# Patient Record
Sex: FEMALE | Race: WHITE | Employment: PART TIME | URBAN - METROPOLITAN AREA
[De-identification: names, ages, dates, MRNs, and addresses within clinical notes are randomized per-mention and may not be internally consistent; named-entity substitution may affect disease eponyms.]

---

## 2017-03-24 ENCOUNTER — HOSPITAL ENCOUNTER (OUTPATIENT)
Dept: MAMMOGRAPHY | Age: 77
Discharge: HOME | End: 2017-03-24
Attending: INTERNAL MEDICINE | Admitting: INTERNAL MEDICINE

## 2017-03-24 DIAGNOSIS — Z12.31 VISIT FOR SCREENING MAMMOGRAM: ICD-10-CM

## 2018-02-19 ENCOUNTER — OFFICE VISIT (OUTPATIENT)
Dept: ORTHOPEDIC SURGERY | Age: 78
End: 2018-02-19

## 2018-02-19 DIAGNOSIS — M25.552 LEFT HIP PAIN: Primary | ICD-10-CM

## 2018-02-19 PROCEDURE — G8420 CALC BMI NORM PARAMETERS: HCPCS | Performed by: ORTHOPAEDIC SURGERY

## 2018-02-19 PROCEDURE — 1036F TOBACCO NON-USER: CPT | Performed by: ORTHOPAEDIC SURGERY

## 2018-02-19 PROCEDURE — 99213 OFFICE O/P EST LOW 20 MIN: CPT | Performed by: ORTHOPAEDIC SURGERY

## 2018-02-19 PROCEDURE — 1123F ACP DISCUSS/DSCN MKR DOCD: CPT | Performed by: ORTHOPAEDIC SURGERY

## 2018-02-19 PROCEDURE — 1090F PRES/ABSN URINE INCON ASSESS: CPT | Performed by: ORTHOPAEDIC SURGERY

## 2018-02-19 PROCEDURE — 4040F PNEUMOC VAC/ADMIN/RCVD: CPT | Performed by: ORTHOPAEDIC SURGERY

## 2018-02-19 PROCEDURE — G8399 PT W/DXA RESULTS DOCUMENT: HCPCS | Performed by: ORTHOPAEDIC SURGERY

## 2018-02-19 PROCEDURE — G8484 FLU IMMUNIZE NO ADMIN: HCPCS | Performed by: ORTHOPAEDIC SURGERY

## 2018-02-19 PROCEDURE — G8427 DOCREV CUR MEDS BY ELIG CLIN: HCPCS | Performed by: ORTHOPAEDIC SURGERY

## 2018-02-26 VITALS
BODY MASS INDEX: 22.81 KG/M2 | RESPIRATION RATE: 18 BRPM | WEIGHT: 116.2 LBS | HEIGHT: 60 IN | SYSTOLIC BLOOD PRESSURE: 165 MMHG | TEMPERATURE: 98.5 F | HEART RATE: 76 BPM | DIASTOLIC BLOOD PRESSURE: 88 MMHG

## 2018-03-03 NOTE — PROGRESS NOTES
Patient is a 43-year-old female status post bilateral total hip arthroplasties. She is status post left hip replacement done in 2014 and right hip replacement done 11 years ago in 2007. Patient states she has occasional pain in both hips and describes the pain as sharp occasionally. She's not had any other significant history of injury or surgery on either right or left hip. Patient does have back problems and has had epidural steroid injections in the past.  She had an MRI obtained of her lumbar spine which shows severe degenerative osteoarthritis. Patient's here for further evaluation and follow-up of her back pain and hip pain. Patient Active Problem List   Diagnosis    Primary localized osteoarthrosis, pelvic region and thigh    H/O total hip arthroplasty    History of total hip replacement    Pain of lumbar spine     Prior to Visit Medications    Medication Sig Taking? Authorizing Provider   ibuprofen (ADVIL;MOTRIN) 200 MG tablet Take 400 mg by mouth every 6 hours as needed for Pain  Historical Provider, MD   aspirin 81 MG tablet Take 81 mg by mouth daily   Historical Provider, MD   Multiple Minerals-Vitamins (CITRACAL PLUS) TABS Take 2 tablets by mouth daily. Historical Provider, MD   Multiple Vitamins-Minerals (THERAPEUTIC MULTIVITAMIN-MINERALS) tablet Take 1 tablet by mouth daily. Historical Provider, MD   calcium carbonate (OSCAL) 500 MG TABS tablet Take 500 mg by mouth daily. Historical Provider, MD   losartan (COZAAR) 100 MG tablet Take 100 mg by mouth nightly. Historical Provider, MD   atenolol (TENORMIN) 50 MG tablet Take 50 mg by mouth nightly. Historical Provider, MD   hydrochlorothiazide (HYDRODIURIL) 25 MG tablet Take 25 mg by mouth daily. Historical Provider, MD     The above medication and passed medical problems were reviewed with the patient. Physical examination 72-year-old female oriented ×3 temperature 98.5.   Examination of her back shows significant loss of range of might eventually require evaluation and/or conservative versus surgical management of her lumbar spine. Plan we had a 15 minute face-to-face discussion with this patient of which greater than 50% of the time was spent in counseling her about further evaluation of her bilateral total hips. We recommend yearly evaluation both physical and radiographic in nature. We also discussed with her her lumbosacral condition and suggest that she be evaluated and follow-up with a spine clinic or spine surgeon. We will follow her up in a year with new x-rays of her hips at that time.

## 2018-03-28 ENCOUNTER — HOSPITAL ENCOUNTER (OUTPATIENT)
Dept: MAMMOGRAPHY | Age: 78
Discharge: HOME | End: 2018-03-28
Attending: INTERNAL MEDICINE | Admitting: INTERNAL MEDICINE

## 2018-03-28 DIAGNOSIS — Z12.31 VISIT FOR SCREENING MAMMOGRAM: ICD-10-CM

## 2019-04-01 ENCOUNTER — HOSPITAL ENCOUNTER (OUTPATIENT)
Dept: MAMMOGRAPHY | Age: 79
Discharge: HOME | End: 2019-04-01
Payer: MEDICARE

## 2019-04-01 DIAGNOSIS — Z12.31 VISIT FOR SCREENING MAMMOGRAM: ICD-10-CM

## 2019-04-01 PROCEDURE — 77063 BREAST TOMOSYNTHESIS BI: CPT

## 2019-05-13 ENCOUNTER — OFFICE VISIT (OUTPATIENT)
Dept: ORTHOPEDIC SURGERY | Age: 79
End: 2019-05-13
Payer: MEDICARE

## 2019-05-13 VITALS
HEART RATE: 68 BPM | TEMPERATURE: 98.8 F | HEIGHT: 60 IN | SYSTOLIC BLOOD PRESSURE: 185 MMHG | DIASTOLIC BLOOD PRESSURE: 83 MMHG | BODY MASS INDEX: 22.78 KG/M2 | WEIGHT: 116 LBS

## 2019-05-13 DIAGNOSIS — Z96.641 HX OF TOTAL HIP ARTHROPLASTY, RIGHT: ICD-10-CM

## 2019-05-13 DIAGNOSIS — M54.5 LOW BACK PAIN, UNSPECIFIED BACK PAIN LATERALITY, UNSPECIFIED CHRONICITY, WITH SCIATICA PRESENCE UNSPECIFIED: ICD-10-CM

## 2019-05-13 DIAGNOSIS — Z96.642 H/O TOTAL HIP ARTHROPLASTY, LEFT: Primary | ICD-10-CM

## 2019-05-13 PROCEDURE — G8399 PT W/DXA RESULTS DOCUMENT: HCPCS | Performed by: ORTHOPAEDIC SURGERY

## 2019-05-13 PROCEDURE — 4040F PNEUMOC VAC/ADMIN/RCVD: CPT | Performed by: ORTHOPAEDIC SURGERY

## 2019-05-13 PROCEDURE — 1090F PRES/ABSN URINE INCON ASSESS: CPT | Performed by: ORTHOPAEDIC SURGERY

## 2019-05-13 PROCEDURE — G8427 DOCREV CUR MEDS BY ELIG CLIN: HCPCS | Performed by: ORTHOPAEDIC SURGERY

## 2019-05-13 PROCEDURE — 1036F TOBACCO NON-USER: CPT | Performed by: ORTHOPAEDIC SURGERY

## 2019-05-13 PROCEDURE — G8420 CALC BMI NORM PARAMETERS: HCPCS | Performed by: ORTHOPAEDIC SURGERY

## 2019-05-13 PROCEDURE — 1123F ACP DISCUSS/DSCN MKR DOCD: CPT | Performed by: ORTHOPAEDIC SURGERY

## 2019-05-13 PROCEDURE — 99213 OFFICE O/P EST LOW 20 MIN: CPT | Performed by: ORTHOPAEDIC SURGERY

## 2019-05-13 NOTE — PROGRESS NOTES
This dictation was done with amaysim dictation and may contain mechanical errors related to translation. Blood pressure (!) 185/83, pulse 68, temperature 98.8 °F (37.1 °C), temperature source Temporal, height 5' (1.524 m), weight 116 lb (52.6 kg), not currently breastfeeding.

## 2019-05-18 NOTE — PROGRESS NOTES
Patient is 77-year-old female who presents today for further evaluation of bilateral hip arthroplasties. She is status post right total hip arthroplasty performed almost 12 years ago in June 2007 and left total hip arthroplasty performed 4-1/2 years ago on 11/26/2014. Patient overall has been doing well and radiographically has had stable implants. She has back pain and severe degenerative osteoarthritis of her lumbar spine. Patient has had a back evaluation and there is an MRI of her lumbar spine noted back in 2015. She complains of bilateral hip pain right side worse than left side in the pains been going on now increasing over the last few weeks. There's been no new history of injury or surgery to either right or left hip. Patient Active Problem List   Diagnosis    Primary localized osteoarthrosis, pelvic region and thigh    H/O total hip arthroplasty    History of total hip replacement    Pain of lumbar spine     Prior to Visit Medications    Medication Sig Taking? Authorizing Provider   ibuprofen (ADVIL;MOTRIN) 200 MG tablet Take 400 mg by mouth every 6 hours as needed for Pain  Historical Provider, MD   aspirin 81 MG tablet Take 81 mg by mouth daily   Historical Provider, MD   Multiple Minerals-Vitamins (CITRACAL PLUS) TABS Take 2 tablets by mouth daily. Historical Provider, MD   Multiple Vitamins-Minerals (THERAPEUTIC MULTIVITAMIN-MINERALS) tablet Take 1 tablet by mouth daily. Historical Provider, MD   calcium carbonate (OSCAL) 500 MG TABS tablet Take 500 mg by mouth daily. Historical Provider, MD   losartan (COZAAR) 100 MG tablet Take 100 mg by mouth nightly. Historical Provider, MD   atenolol (TENORMIN) 50 MG tablet Take 50 mg by mouth nightly. Historical Provider, MD   hydrochlorothiazide (HYDRODIURIL) 25 MG tablet Take 25 mg by mouth daily. Historical Provider, MD     Physical examination 77-year-old female oriented ×3 temperature is 98.8.   Examination of her back shows reduced range of motion obvious scoliotic deformity noted. No signs of obvious instability or deep sepsis are noted in her lumbar spine. Examination of her lower extremity motor shows quadriceps hamstrings hip abductors and abductors foot plantar dorsiflexes are intact 4-5 over 5. Sensation and perfusion are intact to both right and left foot. There is no obvious numbness or tingling noted in either right or left lower extremity. Examination of both of her hips shows a well-healed anterior scar on the left hip and lateral scar in the right hip. Good range of motion with no signs of instability or deep sepsis are noted in either right or left hip. No other obvious cutaneous lesions lymphedema or cellulitic processes are seen in the right or left lower extremity. X-rays obtained AP pelvis AP lateral both right and left hip show status post bilateral uncemented total hip arthroplasties. Stable radiographic evaluation and no change from previous x-rays obtained in the past.  Mild varus alignment of the right hip arthroplasty but it appears to be well ingrown and it does not appear to be any signs of loosening subsidence or failure. There are no signs of instability noted in either right or left hip. Patient does have severe degenerative lumbosacral disease which is noted in the AP pelvis. No other obvious fractures tumors or dislocations are seen on these x-rays. Impression 70-year-old female stable 12 and 40 half years postop uncemented total hip arthroplasties. This patient symptoms are more than likely coming from her low back. If her hips are problem clearly radiographically and by physical examination we'll see any obvious site issues. Always sepsis is part of our diagnostic workup if she continues to have symptoms this may be a reasonable patient's have undergo hip aspirations rule out infection. At this point time I believe she needs workup of her lumbar spine and we will order MRI.   She needs to be followed up in the spine clinic after her MRI. Plan we had a 15 minute face-to-face discussion with this patient of which greater than 50% of time was spent on counseling about further care and evaluation of her bilateral hip arthroplasties and also her low back pain. Clearly there is nothing radiographically as far as her hips are concerned that have or need any surgical attention at this point time. I believe that some or all of her symptoms are coming from her low back and we will see how she responds to a new MRI and evaluation in the spine clinic. We will follow her up on a yearly basis for her bilateral total hip arthroplasties or p.r.n.

## 2019-05-24 ENCOUNTER — TELEPHONE (OUTPATIENT)
Dept: ORTHOPEDIC SURGERY | Age: 79
End: 2019-05-24

## 2019-05-30 ENCOUNTER — HOSPITAL ENCOUNTER (OUTPATIENT)
Dept: MRI IMAGING | Age: 79
Discharge: HOME | End: 2019-05-30
Payer: MEDICARE

## 2019-05-30 DIAGNOSIS — M54.5 LOW BACK PAIN, UNSPECIFIED BACK PAIN LATERALITY, UNSPECIFIED CHRONICITY, WITH SCIATICA PRESENCE UNSPECIFIED: ICD-10-CM

## 2019-05-30 PROCEDURE — 72148 MRI LUMBAR SPINE W/O DYE: CPT

## 2019-06-11 ENCOUNTER — OFFICE VISIT (OUTPATIENT)
Dept: ORTHOPEDIC SURGERY | Age: 79
End: 2019-06-11
Payer: MEDICARE

## 2019-06-11 VITALS
HEART RATE: 86 BPM | RESPIRATION RATE: 16 BRPM | WEIGHT: 103.1 LBS | SYSTOLIC BLOOD PRESSURE: 149 MMHG | BODY MASS INDEX: 20.24 KG/M2 | HEIGHT: 60 IN | DIASTOLIC BLOOD PRESSURE: 79 MMHG

## 2019-06-11 DIAGNOSIS — Z96.641 HX OF TOTAL HIP ARTHROPLASTY, RIGHT: ICD-10-CM

## 2019-06-11 DIAGNOSIS — M48.061 SPINAL STENOSIS OF LUMBAR REGION WITHOUT NEUROGENIC CLAUDICATION: ICD-10-CM

## 2019-06-11 DIAGNOSIS — Z96.642 H/O TOTAL HIP ARTHROPLASTY, LEFT: Primary | ICD-10-CM

## 2019-06-11 PROCEDURE — 1036F TOBACCO NON-USER: CPT | Performed by: PHYSICIAN ASSISTANT

## 2019-06-11 PROCEDURE — G8420 CALC BMI NORM PARAMETERS: HCPCS | Performed by: PHYSICIAN ASSISTANT

## 2019-06-11 PROCEDURE — G8427 DOCREV CUR MEDS BY ELIG CLIN: HCPCS | Performed by: PHYSICIAN ASSISTANT

## 2019-06-11 PROCEDURE — G8399 PT W/DXA RESULTS DOCUMENT: HCPCS | Performed by: PHYSICIAN ASSISTANT

## 2019-06-11 PROCEDURE — 1090F PRES/ABSN URINE INCON ASSESS: CPT | Performed by: PHYSICIAN ASSISTANT

## 2019-06-11 PROCEDURE — 4040F PNEUMOC VAC/ADMIN/RCVD: CPT | Performed by: PHYSICIAN ASSISTANT

## 2019-06-11 PROCEDURE — 99213 OFFICE O/P EST LOW 20 MIN: CPT | Performed by: PHYSICIAN ASSISTANT

## 2019-06-11 PROCEDURE — 1123F ACP DISCUSS/DSCN MKR DOCD: CPT | Performed by: PHYSICIAN ASSISTANT

## 2019-06-11 NOTE — PROGRESS NOTES
(TENORMIN) 50 MG tablet Take 50 mg by mouth nightly.  hydrochlorothiazide (HYDRODIURIL) 25 MG tablet Take 25 mg by mouth daily. No current facility-administered medications for this visit. Objective:   She is alert, oriented x 3, pleasant, well nourished, developed and in no acute distress. BP (!) 149/79   Pulse 86   Resp 16   Ht 5' (1.524 m)   Wt 103 lb 1.6 oz (46.8 kg)   BMI 20.14 kg/m²      LUMBAR SPINE EXAM:  Examination of the Lumbar spine shows:  Deformity Absent . Soft Tissue Swelling Absent . Soft Tissue Tenderness Absent . Midline Bone Tenderness Absent . Paraspinal Muscular Spasm Absent . Previous Incisions Absent . Erythema Absent . Lumbar Flexion does produce pain. Lumbar Extension does produce pain. NEUROLOGICAL EXAM:  SLR     Left: Negative. Right Negative. DTR 1+ bilaterally  Motor Strength Exam:  5/5 in all major motor groups of the lower extremities. Timmons's Sign Absent   Gait normal Heel/ Toe. Sensation to Touch normal    VASCULAR EXAM:  Examination of the upper and lower extremities shows intact perfusion to all extremities, no cyanosis, digits are warm to touch, capillary refill is less than 2 seconds. No significant edema noted. SKIN:  Examination of the skin reveals the skin to be intact without lacerations, abrasions, significant erythema, rashes or skin lesions. MRI: Obtained from [facility] or an outside facility. Narrative   MRI LUMBAR SPINE WITHOUT CONTRAST       HISTORY: 60-year-old woman low back pain       Sagittal sequences of lumbar spine. Axial sequences from T11-T12 through L5-S1 intervertebral discs.  Comparison is MRI lumbar spine 11/15/2016.       Mild-moderate dextroscoliosis centered at mid lumbar level and right lateral listhesis L2, L3 and L4 seen on coronal localizer sequence, similar to prior MRI.       Degenerative disc space narrowing severe L1-S1 with degenerative endplate changes and small marginal osteophytes similar to prior MRI. No change in slight grade grade 1 degenerative spondylolisthesis L4 and L5. No bony lesion or injury.       T11-T12: Minimal diffuse bulging annulus minimally indents ventral thecal sac slightly more on right side. T12-L1: Minimal central disc protrusion minimally indents ventral thecal sac.       L1-L2: No change in mild diffuse bulging annulus mildly effacing ventral thecal sac asymmetrically prominent right posterior lateral secondary to the scoliosis. Moderate hypertrophy of dorsal ligaments and facets mildly efface dorsal thecal sac. No    change in mild left foraminal stenosis. No central canal stenosis.       L2-L3: No change in mild diffuse bulging annulus mildly effacing ventral thecal sac. Moderate hypertrophy of dorsal ligaments and facets efface dorsal thecal sac. The discogenic and spondylotic changes cause mild central canal stenosis and mild bilateral    foraminal stenosis, unchanged from prior MRI.       L3-L4: No change in mild-moderate diffuse bulging annulus mildly effacing ventral thecal sac. No change in dorsal ligament hypertrophy or facet joint arthropathy severe on left. The discogenic and spondylotic changes cause mild central canal stenosis and    moderate left lateral recess stenosis, unchanged from prior MRI. ..       L4-L5: Moderate diffuse bulging annulus effaces ventral thecal sac. Hypertrophy of dorsal ligaments and facets efface dorsal thecal sac. The discogenic and spondylotic changes cause mild right lateral recess stenosis and mild right foraminal stenosis,    unchanged from prior MRI. Len Motto No central canal stenosis.       L5-S1: No change in small central disc protrusion mildly effacing ventral thecal sac. Mild hypertrophy of dorsal ligaments and facets are noncompressive.  No spinal stenosis.       Normal conus terminates at lower L1 level.            Impression       Multilevel severe disc degeneration with diffuse bulging annuli and spondylosis of dorsal arches.     No change in mild central canal stenosis L2-L3 and L3-L4, lateral recess stenosis mild left L1-L2 and mild bilateral L2-L3 or in lateral recess stenosis moderate left L3-L4 and mild right L4-L5.       No high-grade spinal stenosis. No moderate size or large disc herniations. X Rays: not performed in the office today:     Diagnosis:        ICD-10-CM    1. H/O total hip arthroplasty, left Z96.642    2. Hx of total hip arthroplasty, right Z96.641    3. Spinal stenosis of lumbar region without neurogenic claudication M48.061         Assessment/ Plan:      She has a stable hip arthroplasties. MRI demonstrates a multilevel degenerative disc disease, facet arthritis and foraminal as well as canal stenosis. Symptoms of right anterior thigh radicular pain have since resolved. The natural history of the patient's diagnosis as well as the treatment options were discussed in full and questions were answered. Risks and benefits of the treatment options also reviewed in detail. A home exercise program was instructed today including ROM exercises and strengthening exercises. The patient verbalized understanding of these exercises as well as the importance of the exercise program to promote return of normal function. If pain intensifies or other problems arise you are to notify the office. If her thigh complaints return and then a epidural steroid injection may be indicated. She has had these in the past by Dr. Morris Castañeda with minimal relief. She stated she may want to consider other options if her right anterior hip and thigh symptoms were to return. Follow Up:   Call or return to clinic prn if these symptoms worsen or fail to improve as anticipated.

## 2021-04-28 ENCOUNTER — HOSPITAL ENCOUNTER (OUTPATIENT)
Dept: MAMMOGRAPHY | Age: 81
Discharge: HOME | End: 2021-04-28
Payer: MEDICARE

## 2021-04-28 VITALS — WEIGHT: 96 LBS | HEIGHT: 60 IN | BODY MASS INDEX: 18.85 KG/M2

## 2021-04-28 DIAGNOSIS — Z12.31 VISIT FOR SCREENING MAMMOGRAM: ICD-10-CM

## 2021-04-28 PROCEDURE — 77063 BREAST TOMOSYNTHESIS BI: CPT

## 2022-05-16 ENCOUNTER — HOSPITAL ENCOUNTER (OUTPATIENT)
Dept: MAMMOGRAPHY | Age: 82
Discharge: HOME | End: 2022-05-16
Payer: MEDICARE

## 2022-05-16 VITALS — BODY MASS INDEX: 18.46 KG/M2 | HEIGHT: 60 IN | WEIGHT: 94 LBS

## 2022-05-16 DIAGNOSIS — Z12.31 VISIT FOR SCREENING MAMMOGRAM: ICD-10-CM

## 2022-05-16 PROCEDURE — 77063 BREAST TOMOSYNTHESIS BI: CPT

## 2024-07-24 ENCOUNTER — HOSPITAL ENCOUNTER (OUTPATIENT)
Dept: MAMMOGRAPHY | Age: 84
Discharge: HOME | End: 2024-07-24
Payer: MEDICARE

## 2024-07-24 VITALS — BODY MASS INDEX: 18.26 KG/M2 | WEIGHT: 93 LBS | HEIGHT: 60 IN

## 2024-07-24 DIAGNOSIS — Z12.31 VISIT FOR SCREENING MAMMOGRAM: ICD-10-CM

## 2024-07-24 PROCEDURE — 77063 BREAST TOMOSYNTHESIS BI: CPT
